# Patient Record
Sex: FEMALE | Race: WHITE | Employment: OTHER | URBAN - METROPOLITAN AREA
[De-identification: names, ages, dates, MRNs, and addresses within clinical notes are randomized per-mention and may not be internally consistent; named-entity substitution may affect disease eponyms.]

---

## 2021-12-10 ENCOUNTER — HOSPITAL ENCOUNTER (OUTPATIENT)
Age: 76
Discharge: HOME | End: 2021-12-10
Payer: MEDICARE

## 2021-12-10 ENCOUNTER — TELEPHONE (OUTPATIENT)
Dept: CASE MANAGEMENT | Age: 76
End: 2021-12-10

## 2021-12-10 VITALS
HEART RATE: 76 BPM | OXYGEN SATURATION: 97 % | SYSTOLIC BLOOD PRESSURE: 127 MMHG | TEMPERATURE: 99 F | DIASTOLIC BLOOD PRESSURE: 67 MMHG | RESPIRATION RATE: 16 BRPM

## 2021-12-10 DIAGNOSIS — U07.1 COVID-19: ICD-10-CM

## 2021-12-10 DIAGNOSIS — Z20.822 ENCOUNTER FOR LABORATORY TESTING FOR COVID-19 VIRUS: Primary | ICD-10-CM

## 2021-12-10 PROCEDURE — U0002 COVID-19 LAB TEST NON-CDC: HCPCS | Performed by: NURSE PRACTITIONER

## 2021-12-10 PROCEDURE — 99203 OFFICE O/P NEW LOW 30 MIN: CPT | Performed by: NURSE PRACTITIONER

## 2021-12-10 RX ORDER — ASPIRIN 81 MG/1
81 TABLET ORAL DAILY
COMMUNITY

## 2021-12-10 RX ORDER — LOSARTAN POTASSIUM 50 MG/1
TABLET ORAL DAILY
COMMUNITY

## 2021-12-10 RX ORDER — METOPROLOL SUCCINATE 25 MG/1
25 TABLET, EXTENDED RELEASE ORAL DAILY
COMMUNITY

## 2021-12-10 RX ORDER — ALBUTEROL SULFATE 90 UG/1
2 AEROSOL, METERED RESPIRATORY (INHALATION) EVERY 4 HOURS PRN
Qty: 1 EACH | Refills: 0 | Status: SHIPPED | OUTPATIENT
Start: 2021-12-10 | End: 2022-01-09

## 2021-12-10 RX ORDER — AMOXICILLIN 500 MG
CAPSULE ORAL
COMMUNITY

## 2021-12-10 NOTE — ED PROVIDER NOTES
Patient Seen in: [facility]      History   Patient presents with:  Shortness Of Breath    Stated Complaint: short of breath coughing weakness/body aches    Subjective:   43-year-old female presents today with URI symptoms sore throat and body regular rhythm. Pulmonary:      Effort: Pulmonary effort is normal.      Breath sounds: Normal breath sounds. Musculoskeletal:      Cervical back: Normal range of motion and neck supple. Lymphadenopathy:      Cervical: No cervical adenopathy.    Skin:

## 2021-12-10 NOTE — ED INITIAL ASSESSMENT (HPI)
Pt exposed last weekend to covid by a family member. Pt is vaccinated. Since Monday chills, body aches, cough, congestion and fatigue.

## 2021-12-10 NOTE — TELEPHONE ENCOUNTER
Called patient to schedule infusion  Patient states at this time she does not want to have infusion done

## 2024-04-03 NOTE — ED INITIAL ASSESSMENT (HPI)
Pt with c/o rash ongoing since Monday.  Pt states to leg and abdomen.  Pt started on plavix 3 weeks ago, only new medication

## 2024-04-03 NOTE — ED PROVIDER NOTES
Patient Seen in: [facility]      History     Chief Complaint   Patient presents with    Rash Skin Problem     Stated Complaint: rash    Subjective:   78-year-old female presents today with a rash to the left lower leg and left abdomen.  Does have itching to the area.  States has been working outside initially thought was a bug bite but states was concern for possible shingles or something that is contagious.  Denies any other symptoms.  The patient's medication list, past medical history and social history elements as listed in today's nurse's notes were reviewed and agreed (except as otherwise stated in the HPI).  The patient's family history reviewed and determined to be noncontributory to the presenting problem            Objective:   Past Medical History:   Diagnosis Date    Atherosclerosis of coronary artery     Diabetes (HCC)     Essential hypertension               Past Surgical History:   Procedure Laterality Date    APPENDECTOMY                  Social History     Socioeconomic History    Marital status:    Tobacco Use    Smoking status: Never    Smokeless tobacco: Never              Review of Systems    Positive for stated complaint: rash  Other systems are as noted in HPI.  Constitutional and vital signs reviewed.      All other systems reviewed and negative except as noted above.    Physical Exam     ED Triage Vitals [04/03/24 1420]   /70   Pulse 75   Resp 18   Temp 98 °F (36.7 °C)   Temp src Temporal   SpO2 97 %   O2 Device None (Room air)       Current:/70   Pulse 75   Temp 98 °F (36.7 °C) (Temporal)   Resp 18   SpO2 97%         Physical Exam  Vitals and nursing note reviewed.   Constitutional:       Appearance: Normal appearance.   HENT:      Head: Normocephalic.      Mouth/Throat:      Mouth: Mucous membranes are moist.   Cardiovascular:      Rate and Rhythm: Normal rate.   Pulmonary:      Effort: Pulmonary effort is normal.   Skin:     General: Skin is warm and  dry.      Comments: Clustered raised lesions to the left outer leg and left abdomen.   Neurological:      Mental Status: She is alert and oriented to person, place, and time.               ED Course   Labs Reviewed - No data to display                   MDM     Please note that this report has been produced using speech recognition software and may contain errors related to that system including, but not limited to, errors in grammar, punctuation, and spelling, as well as words and phrases that possibly may have been recognized inappropriately.  If there are any questions or concerns, contact the dictating provider for clarification.        Note to patient: The 21st Century Cures Act makes medical notes like these available to patients in the interest of transparency. However, this is a medical document intended as peer to peer communication. It is written in medical language and may contain abbreviations or verbiage that are unfamiliar. It may appear blunt or direct. Medical documents are intended to carry relevant information, facts as evident, and the clinical opinion of the practitioner.                                   Medical Decision Making  Differential diagnosis includes but is not limited to: Allergic reaction/hives, fungal infection, viral exanthem, necrotizing fasciitis, insect bite      Presents today with cluster of raised erythematous rash to the outside of the left lower leg as well as abdomen.  Suspect possible insect bite as cause of symptoms.  Will give prescription for prednisone to take as directed.  Encouraged also take over-the-counter antihistamine.  Skin care instructions given.  To follow-up with primary care physician or dermatology in 1 week if symptoms do not improve.  Follow-up sooner with any worsening symptoms.  Patient verbalized understanding and agreed with plan of care.    Risk  OTC drugs.  Prescription drug management.        Disposition and Plan     Clinical Impression:  1. Bug  bite, initial encounter         Disposition:  Discharge  4/3/2024  2:23 pm    Follow-up:  Brent Perez  [address] [facility] [address] [phone]    In 1 week  As needed          Medications Prescribed:  Current Discharge Medication List        START taking these medications    Details   predniSONE 20 MG Oral Tab Take 1 tablet (20 mg total) by mouth daily for 5 days.  Qty: 5 tablet, Refills: 0